# Patient Record
Sex: MALE | Race: WHITE | Employment: FULL TIME | URBAN - METROPOLITAN AREA
[De-identification: names, ages, dates, MRNs, and addresses within clinical notes are randomized per-mention and may not be internally consistent; named-entity substitution may affect disease eponyms.]

---

## 2017-08-04 ENCOUNTER — OFFICE VISIT (OUTPATIENT)
Dept: FAMILY MEDICINE | Facility: CLINIC | Age: 27
End: 2017-08-04
Payer: COMMERCIAL

## 2017-08-04 VITALS
HEART RATE: 76 BPM | DIASTOLIC BLOOD PRESSURE: 87 MMHG | SYSTOLIC BLOOD PRESSURE: 130 MMHG | TEMPERATURE: 98.5 F | WEIGHT: 260 LBS

## 2017-08-04 DIAGNOSIS — R19.5 CHANGE IN STOOL: ICD-10-CM

## 2017-08-04 DIAGNOSIS — R10.13 ABDOMINAL PAIN, EPIGASTRIC: Primary | ICD-10-CM

## 2017-08-04 DIAGNOSIS — K92.1 BLOOD IN STOOL: ICD-10-CM

## 2017-08-04 PROCEDURE — 99204 OFFICE O/P NEW MOD 45 MIN: CPT | Performed by: FAMILY MEDICINE

## 2017-08-04 RX ORDER — PANTOPRAZOLE SODIUM 40 MG/1
40 TABLET, DELAYED RELEASE ORAL DAILY
Qty: 90 TABLET | Refills: 1 | Status: SHIPPED | OUTPATIENT
Start: 2017-08-04 | End: 2018-02-26

## 2017-08-04 NOTE — PATIENT INSTRUCTIONS
Please  call [phone] to schedule your ultrasound.      Start protonix everyday.        Follow up with Nina Matthews MD in 6 weeks.

## 2017-08-04 NOTE — NURSING NOTE
Chief Complaint   Patient presents with     Abdominal Pain       Initial /87  Pulse 76  Temp 98.5  F (36.9  C) (Oral)  Wt 260 lb (117.9 kg) There is no height or weight on file to calculate BMI.  Medication Reconciliation: complete  Quita Diaz , CMA

## 2017-08-04 NOTE — MR AVS SNAPSHOT
After Visit Summary   8/4/2017    Cristofer Castillo    MRN: [id number]           Patient Information     Date Of Birth          1990        Visit Information        Provider Department      8/4/2017 11:55 AM Nina Matthews MD [facility]        Today's Diagnoses     Abdominal pain, epigastric    -  1    Blood in stool        Change in stool          Care Instructions    Please  call [phone] to schedule your ultrasound.      Start protonix everyday.        Follow up with Nina Matthews MD in 6 weeks.            Follow-ups after your visit        Additional Services     GASTROENTEROLOGY ADULT REF PROCEDURE ONLY       Last Lab Result: No results found for: CR  There is no height or weight on file to calculate BMI.     Needed:  No  Language:  English    Patient will be contacted to schedule procedure.     Please be aware that coverage of these services is subject to the terms and limitations of your health insurance plan.  Call member services at your health plan with any benefit or coverage questions.  Any procedures must be performed at a [facility] facility OR coordinated by your clinic's referral office.    Please bring the following with you to your appointment:    (1) Any X-Rays, CTs or MRIs which have been performed.  Contact the facility where they were done to arrange for  prior to your scheduled appointment.    (2) List of current medications   (3) This referral request   (4) Any documents/labs given to you for this referral                  Future tests that were ordered for you today     Open Future Orders        Priority Expected Expires Ordered    US Abdomen Complete Routine  8/4/2018 8/4/2017            Who to contact     If you have questions or need follow up information about today's clinic visit or your schedule please contact [facility] directly at [phone].  Normal or non-critical lab and imaging results will be communicated  "to you by MyChart, letter or phone within 4 business days after the clinic has received the results. If you do not hear from us within 7 days, please contact the clinic through UpMo or phone. If you have a critical or abnormal lab result, we will notify you by phone as soon as possible.  Submit refill requests through UpMo or call your pharmacy and they will forward the refill request to us. Please allow 3 business days for your refill to be completed.          Additional Information About Your Visit        [facility] [employer] Information     UpMo lets you send messages to your doctor, view your test results, renew your prescriptions, schedule appointments and more. To sign up, go to [email or web address] . Click on \"Log in\" on the left side of the screen, which will take you to the Welcome page. Then click on \"Sign up Now\" on the right side of the page.     You will be asked to enter the access code listed below, as well as some personal information. Please follow the directions to create your username and password.     Your access code is: [email or web address]  Expires: 2017 12:23 PM     Your access code will  in 90 days. If you need help or a new code, please call your [place] clinic or [phone].        Care EveryWhere ID     This is your Care EveryWhere ID. This could be used by other organizations to access your [place] medical records  [id number]        Your Vitals Were     Pulse Temperature                76 98.5  F (36.9  C) (Oral)           Blood Pressure from Last 3 Encounters:   17 130/87    Weight from Last 3 Encounters:   17 260 lb (117.9 kg)              We Performed the Following     GASTROENTEROLOGY ADULT REF PROCEDURE ONLY          Today's Medication Changes          These changes are accurate as of: 17 12:23 PM.  If you have any questions, ask your nurse or doctor.               Start taking these medicines.        Dose/Directions    pantoprazole 40 MG EC tablet   Commonly " known as:  PROTONIX   Used for:  Abdominal pain, epigastric   Started by:  Nina Matthews MD        Dose:  40 mg   Take 1 tablet (40 mg) by mouth daily Take 30-60 minutes before a meal.   Quantity:  90 tablet   Refills:  1            Where to get your medicines      These medications were sent to [facility] [address] [place], [place] - [address] [facility] [address]     Phone:  [phone]     pantoprazole 40 MG EC tablet                Primary Care Provider Office Phone # Fax #    [facility] [phone] [address] [facility]. [facility] [address]        Equal Access to Services     RICHIE BUSH : Gabby mckeono Sosamantha, waaxda luqadaha, qaybta kaalmada adehyacinthyada, josi george. So [facility] [phone].    ATENCIÓN: Si habla español, tiene a munson disposición servicios gratuitos de asistencia lingüística. Llame al [phone].    We comply with applicable federal civil rights laws and [place] laws. We do not discriminate on the basis of race, color, national origin, age, disability sex, sexual orientation or gender identity.            Thank you!     Thank you for choosing [facility]  for your care. Our goal is always to provide you with excellent care. Hearing back from our patients is one way we can continue to improve our services. Please take a few minutes to complete the written survey that you may receive in the mail after your visit with us. Thank you!             Your Updated Medication List - Protect others around you: Learn how to safely use, store and throw away your medicines at www.disposemymeds.org.          This list is accurate as of: 8/4/17 12:23 PM.  Always use your most recent med list.                   Brand Name Dispense Instructions for use Diagnosis    pantoprazole 40 MG EC tablet    PROTONIX    90 tablet    Take 1 tablet (40 mg) by mouth daily Take 30-60 minutes before a meal.     Abdominal pain, epigastric

## 2017-08-04 NOTE — PROGRESS NOTES
SUBJECTIVE:                                                    Cristofer Castillo is a 26 year old male who presents to clinic today for the following health issues:      Abdominal Pain      Duration: ongoing     Description (location/character/radiation): center       Associated flank pain: None    Intensity:  moderate    Accompanying signs and symptoms:        Fever/Chills: no        Gas/Bloating: YES- heartburn        Nausea/vomitting: YES       Diarrhea: YES- very frequent but also has hard firm stools, pt will have some bright red blood, states that sometimes will be painful        Dysuria or Hematuria: no     History (previous similar pain/trauma/previous testing): pt states that after eating greasy food or rich food will make patient vomit , sometimes triggered by stress , has high stress job     Has [facility] for [facility]ac [facility]     Precipitating or alleviating factors:       Pain worse with eating/BM/urination: see above , beer        Pain relieved by BM: YES- pain goes away with BM , sometimes will have blood in actual stool not just on TP, does have pain with passing stool     Therapies tried and outcome: pt states that he will have a diet coke and pain will get better, does take fiber     LMP:  not applicable    Has been diagnosed in past with hemorrhoids/fissure and GERD but medications do not help.  Admits sometimes fiber helps but is inconsistent with taking it.    No swallowing difficulties or breathing problems          Problem list and histories reviewed & adjusted, as indicated.  Additional history: as documented    There is no problem list on file for this patient.    History reviewed. No pertinent surgical history.    Social History   Substance Use Topics     Smoking status: Never Smoker     Smokeless tobacco: Never Used     Alcohol use Yes     History reviewed. No pertinent family history.      Current Outpatient Prescriptions   Medication Sig Dispense Refill      pantoprazole (PROTONIX) 40 MG EC tablet Take 1 tablet (40 mg) by mouth daily Take 30-60 minutes before a meal. 90 tablet 1     BP Readings from Last 3 Encounters:   08/04/17 130/87    Wt Readings from Last 3 Encounters:   08/04/17 260 lb (117.9 kg)                  Labs reviewed in EPIC        Reviewed and updated as needed this visit by clinical staffTobacco  Allergies  Meds  Problems  Med Hx  Surg Hx  Fam Hx  Soc Hx        Reviewed and updated as needed this visit by Provider  Tobacco  Allergies  Meds  Problems  Med Hx  Surg Hx  Soc Hx        ROS:  Constitutional, HEENT, cardiovascular, pulmonary, gi and gu systems are negative, except as otherwise noted.      OBJECTIVE:   /87  Pulse 76  Temp 98.5  F (36.9  C) (Oral)  Wt 260 lb (117.9 kg)  There is no height or weight on file to calculate BMI.  GENERAL: healthy, alert and no distress  EYES: Eyes grossly normal to inspection, PERRL and conjunctivae and sclerae normal  ABDOMEN:sof,  tenderness epigastric and LLQ, no organomegaly or masses and bowel sounds normal  RECTAL (male): normal sphincter tone, no rectal masses, prostate normal size, smooth, nontender without nodules or masses  MS: no gross musculoskeletal defects noted, no edema  SKIN: no suspicious lesions or rashes  PSYCH: mentation appears normal, affect normal/bright and anxious    Diagnostic Test Results:  none     ASSESSMENT/PLAN:     (R10.13) Abdominal pain, epigastric  (primary encounter diagnosis)  Comment: likely GERD but possible gallbladder issue  Plan: pantoprazole (PROTONIX) 40 MG EC tablet, US         Abdomen Complete        Start protonix daily. US of abdomen, may need to consider EGD  F/u 6 weeks in clinic    (K92.1) Blood in stool  (R19.5) Change in stool  Comment: likely due to fissure and IBSbut cannot fully r/o IBD,   Plan: GASTROENTEROLOGY ADULT REF PROCEDURE ONLY        Refer for colonoscopy      See Patient Instructions    Nina Matthews MD  [facility]  [place]

## 2017-08-17 ENCOUNTER — HOSPITAL ENCOUNTER (OUTPATIENT)
Facility: AMBULATORY SURGERY CENTER | Age: 27
Discharge: HOME | End: 2017-08-17
Attending: SURGERY | Admitting: SURGERY
Payer: COMMERCIAL

## 2017-08-17 ENCOUNTER — SURGERY (OUTPATIENT)
Age: 27
End: 2017-08-17
Payer: COMMERCIAL

## 2017-08-17 VITALS
RESPIRATION RATE: 20 BRPM | TEMPERATURE: 98.3 F | DIASTOLIC BLOOD PRESSURE: 94 MMHG | BODY MASS INDEX: 33.37 KG/M2 | WEIGHT: 260 LBS | HEIGHT: 74 IN | OXYGEN SATURATION: 98 % | SYSTOLIC BLOOD PRESSURE: 126 MMHG

## 2017-08-17 LAB
COLONOSCOPY: NORMAL
UPPER GI ENDOSCOPY: NORMAL

## 2017-08-17 PROCEDURE — G8918 PT W/O PREOP ORDER IV AB PRO: HCPCS

## 2017-08-17 PROCEDURE — G8907 PT DOC NO EVENTS ON DISCHARG: HCPCS

## 2017-08-17 PROCEDURE — 43239 EGD BIOPSY SINGLE/MULTIPLE: CPT

## 2017-08-17 PROCEDURE — 88305 TISSUE EXAM BY PATHOLOGIST: CPT | Performed by: SURGERY

## 2017-08-17 PROCEDURE — 45380 COLONOSCOPY AND BIOPSY: CPT | Performed by: SURGERY

## 2017-08-17 PROCEDURE — 99153 MOD SED SAME PHYS/QHP EA: CPT | Performed by: SURGERY

## 2017-08-17 PROCEDURE — 99152 MOD SED SAME PHYS/QHP 5/>YRS: CPT | Performed by: SURGERY

## 2017-08-17 PROCEDURE — 43239 EGD BIOPSY SINGLE/MULTIPLE: CPT | Performed by: SURGERY

## 2017-08-17 PROCEDURE — 45380 COLONOSCOPY AND BIOPSY: CPT

## 2017-08-17 RX ORDER — DIPHENHYDRAMINE HYDROCHLORIDE 50 MG/ML
INJECTION INTRAMUSCULAR; INTRAVENOUS PRN
Status: DISCONTINUED | OUTPATIENT
Start: 2017-08-17 | End: 2017-08-17 | Stop reason: HOSPADM

## 2017-08-17 RX ORDER — LIDOCAINE 40 MG/G
CREAM TOPICAL
Status: DISCONTINUED | OUTPATIENT
Start: 2017-08-17 | End: 2017-08-18 | Stop reason: HOSPADM

## 2017-08-17 RX ORDER — FENTANYL CITRATE 50 UG/ML
INJECTION, SOLUTION INTRAMUSCULAR; INTRAVENOUS PRN
Status: DISCONTINUED | OUTPATIENT
Start: 2017-08-17 | End: 2017-08-17 | Stop reason: HOSPADM

## 2017-08-17 RX ADMIN — FENTANYL CITRATE 100 MCG: 50 INJECTION, SOLUTION INTRAMUSCULAR; INTRAVENOUS at 13:15

## 2017-08-17 RX ADMIN — DIPHENHYDRAMINE HYDROCHLORIDE 12.5 MG: 50 INJECTION INTRAMUSCULAR; INTRAVENOUS at 12:44

## 2017-08-17 RX ADMIN — FENTANYL CITRATE 100 MCG: 50 INJECTION, SOLUTION INTRAMUSCULAR; INTRAVENOUS at 12:40

## 2017-08-17 RX ADMIN — FENTANYL CITRATE 100 MCG: 50 INJECTION, SOLUTION INTRAMUSCULAR; INTRAVENOUS at 13:03

## 2017-08-21 LAB — COPATH REPORT: NORMAL

## 2017-09-22 ENCOUNTER — RADIANT APPOINTMENT (OUTPATIENT)
Dept: ULTRASOUND IMAGING | Facility: CLINIC | Age: 27
End: 2017-09-22
Attending: FAMILY MEDICINE
Payer: COMMERCIAL

## 2017-09-22 DIAGNOSIS — R10.13 ABDOMINAL PAIN, EPIGASTRIC: ICD-10-CM

## 2017-09-22 PROCEDURE — 76700 US EXAM ABDOM COMPLETE: CPT

## 2018-02-26 DIAGNOSIS — R10.13 ABDOMINAL PAIN, EPIGASTRIC: ICD-10-CM

## 2018-02-27 RX ORDER — PANTOPRAZOLE SODIUM 40 MG/1
TABLET, DELAYED RELEASE ORAL
Qty: 90 TABLET | Refills: 1 | Status: SHIPPED | OUTPATIENT
Start: 2018-02-27 | End: 2021-12-29

## 2020-03-11 ENCOUNTER — HEALTH MAINTENANCE LETTER (OUTPATIENT)
Age: 30
End: 2020-03-11

## 2020-12-27 ENCOUNTER — HEALTH MAINTENANCE LETTER (OUTPATIENT)
Age: 30
End: 2020-12-27

## 2021-04-12 ENCOUNTER — OFFICE VISIT (OUTPATIENT)
Dept: OPTOMETRY | Facility: CLINIC | Age: 31
End: 2021-04-12
Payer: COMMERCIAL

## 2021-04-12 DIAGNOSIS — Z01.00 ENCOUNTER FOR EXAMINATION OF EYES AND VISION WITHOUT ABNORMAL FINDINGS: Primary | ICD-10-CM

## 2021-04-12 DIAGNOSIS — Z46.0 CONTACT LENS/GLASSES FITTING: ICD-10-CM

## 2021-04-12 DIAGNOSIS — H52.13 MYOPIA OF BOTH EYES: ICD-10-CM

## 2021-04-12 PROCEDURE — 92310 CONTACT LENS FITTING OU: CPT | Mod: GA | Performed by: OPTOMETRIST

## 2021-04-12 PROCEDURE — 92004 COMPRE OPH EXAM NEW PT 1/>: CPT | Performed by: OPTOMETRIST

## 2021-04-12 ASSESSMENT — EXTERNAL EXAM - RIGHT EYE: OD_EXAM: NORMAL

## 2021-04-12 ASSESSMENT — VISUAL ACUITY
OS_CC: 20/20
OD_CC+: -1
CORRECTION_TYPE: GLASSES
METHOD: SNELLEN - LINEAR
OD_CC: 20/20

## 2021-04-12 ASSESSMENT — CUP TO DISC RATIO
OS_RATIO: 0.3
OD_RATIO: 0.25

## 2021-04-12 ASSESSMENT — REFRACTION_CURRENTRX
OD_BRAND: J&J 1-DAY ACUVUE MOIST BC 8.5, D 14.2
OD_SPHERE: -1.00
OS_SPHERE: -1.00
OS_BRAND: J&J 1-DAY ACUVUE MOIST BC 8.5, D 14.2

## 2021-04-12 ASSESSMENT — TONOMETRY
OD_IOP_MMHG: 14
IOP_METHOD: APPLANATION
OS_IOP_MMHG: 14

## 2021-04-12 ASSESSMENT — REFRACTION_MANIFEST
OD_CYLINDER: SPHERE
OS_SPHERE: -1.00
OD_SPHERE: -1.00
OS_CYLINDER: SPHERE

## 2021-04-12 ASSESSMENT — REFRACTION_WEARINGRX
OD_CYLINDER: SPHERE
OS_CYLINDER: SPHERE
OD_SPHERE: -1.00
SPECS_TYPE: SVL
OS_SPHERE: -1.00

## 2021-04-12 ASSESSMENT — SLIT LAMP EXAM - LIDS
COMMENTS: NORMAL
COMMENTS: NORMAL

## 2021-04-12 ASSESSMENT — EXTERNAL EXAM - LEFT EYE: OS_EXAM: NORMAL

## 2021-04-12 ASSESSMENT — CONF VISUAL FIELD
OD_NORMAL: 1
OS_NORMAL: 1

## 2021-04-12 NOTE — PATIENT INSTRUCTIONS
Glasses prescription provided today. No change from current glasses.     Begin trial of 1-Day Acuvue Moist contact lenses.   Maximum wear-time of 12 hours/day of the contact lenses.   Clean the lenses nightly in contact lens solution.   No sleeping or swimming in the contact lenses.     If adequate comfort/vision with contact lenses, we can finalize the prescription. If not, notify me and we can consider other options.     Dilation deferred today. Patient educated on the importance of dilation in order to fully assess the internal ocular health. Patient voiced understanding. Return to clinic to complete the dilated portion of the examination, if desired.     Colten Murray, KANDACE  [address]  [place]  [address] [phone]

## 2021-04-12 NOTE — LETTER
4/12/2021         RE: Cristofer Castillo  [address] [facility] [address]        Dear Colleague,    Thank you for referring your patient, Cristofer Castillo, to the [facility]. Please see a copy of my visit note below.    Chief Complaint   Patient presents with     COMPREHENSIVE EYE EXAM     Accompanied by self  Previous contact lens wearer? Yes: soft daily lenses.  Comfort of contact lenses :good  Satisfied with current lenses: Yes, unsure of brand; has tried several in the past, never really had any issues      Last Eye Exam: 2019 @ [facility]  Dilated Previously: Yes. PT requests to defer dilation today.    What are you currently using to see?  glasses and contacts    Distance Vision Acuity: Satisfied with vision    Near Vision Acuity: Satisfied with vision while reading and using computer unaided    Eye Comfort: good  Do you use eye drops? 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       OBJECTIVE: See Ophthalmology exam    ASSESSMENT:    ICD-10-CM    1. Encounter for examination of eyes and vision without abnormal findings  Z01.00    2. Myopia of both eyes  H52.13    3. Contact lens/glasses fitting  Z46.0       PLAN:     Patient Instructions   Glasses prescription provided today. No change from current glasses.     Begin trial of 1-Day Acuvue Moist contact lenses.   Maximum wear-time of 12 hours/day of the contact lenses.   Clean the lenses nightly in contact lens solution.   No sleeping or swimming in the contact lenses.     If adequate comfort/vision with contact lenses, we can finalize the prescription. If not, notify me and we can consider other options.     Dilation deferred today. Patient educated on the importance of dilation in order to fully assess the internal ocular health. Patient voiced understanding. Return to clinic to complete  the dilated portion of the examination, if desired.     Colten Murray OD  [address]. [place]  [address]    [phone]                              Again, thank you for allowing me to participate in the care of your patient.        Sincerely,        Colten Murray OD

## 2021-04-12 NOTE — PROGRESS NOTES
Chief Complaint   Patient presents with     COMPREHENSIVE EYE EXAM     Accompanied by self  Previous contact lens wearer? Yes: soft daily lenses.  Comfort of contact lenses :good  Satisfied with current lenses: Yes, unsure of brand; has tried several in the past, never really had any issues      Last Eye Exam: 2019 @ [facility]  Dilated Previously: Yes. PT requests to defer dilation today.    What are you currently using to see?  glasses and contacts    Distance Vision Acuity: Satisfied with vision    Near Vision Acuity: Satisfied with vision while reading and using computer unaided    Eye Comfort: good  Do you use eye drops? 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       OBJECTIVE: See Ophthalmology exam    ASSESSMENT:    ICD-10-CM    1. Encounter for examination of eyes and vision without abnormal findings  Z01.00    2. Myopia of both eyes  H52.13    3. Contact lens/glasses fitting  Z46.0       PLAN:     Patient Instructions   Glasses prescription provided today. No change from current glasses.     Begin trial of 1-Day Acuvue Moist contact lenses.   Maximum wear-time of 12 hours/day of the contact lenses.   Clean the lenses nightly in contact lens solution.   No sleeping or swimming in the contact lenses.     If adequate comfort/vision with contact lenses, we can finalize the prescription. If not, notify me and we can consider other options.     Dilation deferred today. Patient educated on the importance of dilation in order to fully assess the internal ocular health. Patient voiced understanding. Return to clinic to complete the dilated portion of the examination, if desired.     Colten Murray, OD  [facility] [place]  [address] [place] [address]. [place]  [address]    [phone]

## 2021-04-25 ENCOUNTER — HEALTH MAINTENANCE LETTER (OUTPATIENT)
Age: 31
End: 2021-04-25

## 2021-09-20 ENCOUNTER — E-VISIT (OUTPATIENT)
Dept: URGENT CARE | Facility: URGENT CARE | Age: 31
End: 2021-09-20
Payer: COMMERCIAL

## 2021-09-20 DIAGNOSIS — Z20.822 SUSPECTED COVID-19 VIRUS INFECTION: Primary | ICD-10-CM

## 2021-09-20 PROCEDURE — 99421 OL DIG E/M SVC 5-10 MIN: CPT | Performed by: NURSE PRACTITIONER

## 2021-09-20 NOTE — PATIENT INSTRUCTIONS
Dear Cristofer Castillo,    Your symptoms show that you may have coronavirus (COVID-19). This illness can cause fever, cough and trouble breathing. Many people get a mild case and get better on their own. Some people can get very sick.    Will I be tested for COVID-19?  We would like to test you for Covid-19 virus. I have placed orders for this test.     For all employees or close contacts (except [place] and [employer] - see below), go to your [employer] home page and scroll down to the section that says  You have an appointment that needs to be scheduled  and click the large green button that says  Schedule Now  and follow the steps to find the next available opening.     If you are unable to complete these steps or if you cannot find any available times, please call [phone] to schedule employee testing.     [place] employees or close contacts, please call [phone].   [place] ([employer]) employees or close contacts call [phone].    Return to work/school/ guidance:  Please let your workplace manager and staffing office know when your quarantine ends     We can t give you an exact date as it depends on the above. You can calculate this on your own or work with your manager/staffing office to calculate this. (For example if you were exposed on 10/4, you would have to quarantine for 14 full days. That would be through 10/18. You could return on 10/19.)      If you receive a positive COVID-19 test result, follow the guidance of the those who are giving you the results. Usually the return to work is 10 (or in some cases 20 days from symptom onset.) If you work at [employer], you must also be cleared by Employee Occupational Health and Safety to return to work.        If you receive a negative COVID-19 test result and did not have a high risk exposure to someone with a known positive COVID-19 test, you can return to work once you're free of fever for 24 hours without fever-reducing medication and  your symptoms are improving or resolved.      If you receive a negative COVID-19 test and If you had a high risk exposure to someone who has tested positive for COVID-19 then you can return to work 14 days after your last contact with the positive individual    Note: If you have ongoing exposure to the covid positive person, this quarantine period may be more than 14 days. (For example, if you are continued to be exposed to your child who tested positive and cannot isolate from them, then the quarantine of 7-14 days can't start until your child is no longer contagious. This is typically 10 days from onset of the child's symptoms. So the total duration may be 17-24 days in this case.)    Sign up for [employer].   We know it's scary to hear that you might have COVID-19. We want to track your symptoms to make sure you're okay over the next 2 weeks. Please look for an email from [employer]--this is a free, online program that we'll use to keep in touch. To sign up, follow the link in the email you will receive. Learn more at [email or web address]    How can I take care of myself?    Get lots of rest. Drink extra fluids (unless a doctor has told you not to)    Take Tylenol (acetaminophen) or ibuprofen for fever or pain. If you have liver or kidney problems, ask your family doctor if it's okay to take Tylenol o ibuprofen    If you have other health problems (like cancer, heart failure, an organ transplant or severe kidney disease): Call your specialty clinic if you don't feel better in the next 2 days.    Know when to call 911. Emergency warning signs include:  o Trouble breathing or shortness of breath  o Pain or pressure in the chest that doesn't go away  o Feeling confused like you haven't felt before, or not being able to wake up  o Bluish-colored lips or face    Where can I get more information?   [facility] - About COVID-19:   [email or web address]    CDC - What to Do If You're Sick:    www.cdc.gov/coronavirus/2019-ncov/about/steps-when-sick.html

## 2021-09-21 ENCOUNTER — NURSE TRIAGE (OUTPATIENT)
Dept: NURSING | Facility: CLINIC | Age: 31
End: 2021-09-21

## 2021-09-21 ENCOUNTER — HOSPITAL ENCOUNTER (EMERGENCY)
Facility: CLINIC | Age: 31
Discharge: HOME | End: 2021-09-21
Attending: EMERGENCY MEDICINE | Admitting: EMERGENCY MEDICINE
Payer: COMMERCIAL

## 2021-09-21 VITALS
HEART RATE: 88 BPM | TEMPERATURE: 98.6 F | BODY MASS INDEX: 37.77 KG/M2 | HEIGHT: 73 IN | WEIGHT: 285 LBS | DIASTOLIC BLOOD PRESSURE: 85 MMHG | SYSTOLIC BLOOD PRESSURE: 131 MMHG | OXYGEN SATURATION: 98 % | RESPIRATION RATE: 16 BRPM

## 2021-09-21 DIAGNOSIS — B08.4 HAND, FOOT AND MOUTH DISEASE: ICD-10-CM

## 2021-09-21 LAB
DEPRECATED S PYO AG THROAT QL EIA: NEGATIVE
GROUP A STREP BY PCR: NOT DETECTED

## 2021-09-21 PROCEDURE — 99283 EMERGENCY DEPT VISIT LOW MDM: CPT | Performed by: EMERGENCY MEDICINE

## 2021-09-21 PROCEDURE — 87651 STREP A DNA AMP PROBE: CPT | Performed by: EMERGENCY MEDICINE

## 2021-09-21 PROCEDURE — 99284 EMERGENCY DEPT VISIT MOD MDM: CPT | Performed by: EMERGENCY MEDICINE

## 2021-09-21 PROCEDURE — 250N000013 HC RX MED GY IP 250 OP 250 PS 637: Performed by: EMERGENCY MEDICINE

## 2021-09-21 RX ORDER — IBUPROFEN 200 MG
800 TABLET ORAL EVERY 8 HOURS PRN
Qty: 30 TABLET | Refills: 0 | COMMUNITY
Start: 2021-09-21 | End: 2021-09-26

## 2021-09-21 RX ORDER — IBUPROFEN 400 MG/1
800 TABLET, FILM COATED ORAL ONCE
Status: COMPLETED | OUTPATIENT
Start: 2021-09-21 | End: 2021-09-21

## 2021-09-21 RX ORDER — ACETAMINOPHEN 500 MG
1000 TABLET ORAL EVERY 8 HOURS PRN
Qty: 30 TABLET | Refills: 0 | COMMUNITY
Start: 2021-09-21 | End: 2021-09-26

## 2021-09-21 RX ADMIN — IBUPROFEN 800 MG: 400 TABLET, FILM COATED ORAL at 04:12

## 2021-09-21 ASSESSMENT — ENCOUNTER SYMPTOMS
CHEST TIGHTNESS: 0
FATIGUE: 0
DIARRHEA: 0
BACK PAIN: 0
SHORTNESS OF BREATH: 0
NAUSEA: 0
DIAPHORESIS: 0
NUMBNESS: 0
ABDOMINAL PAIN: 0
HEADACHES: 1
DECREASED CONCENTRATION: 0
APPETITE CHANGE: 1
RHINORRHEA: 0
FEVER: 0
LIGHT-HEADEDNESS: 0
COUGH: 0
SORE THROAT: 1
NERVOUS/ANXIOUS: 1
VOMITING: 0
WEAKNESS: 0

## 2021-09-21 ASSESSMENT — MIFFLIN-ST. JEOR: SCORE: 2316.63

## 2021-09-21 NOTE — ED PROVIDER NOTES
History     Chief Complaint   Patient presents with     Rash     hands ears      HPI  Cristofer Castillo is a 30 year old male with no significant protruding past medical history presented for evaluation of rash.  Patient ports he felt sick over the weekend with abdominal pain, nausea, vomiting and sore throat.  Was having some fever and chills as well which have largely subsided but yesterday and today started developing rash on his hands.  He reports painful red lesions on his hands as well as sensation of painful similar lesions on his feet although has not seen as many dots on his feet.  Also developed some lesions on his ears bilaterally, right more than left and on the nose.  Does feel similar painful sores in his mouth and is having some pain with swallowing.  Overall he reports he is feeling better today but the new painful lesions were concerning especially when he developed them on his face and ears.  No known sick contacts.  He does have 1-year-old child at home but reports the child has no rash or similar symptoms.  Patient reports he has been eating less than normal due to the pain with swallowing but still drinking.  Normal urination and bowel movements.    Allergies:  Allergies   Allergen Reactions     Naila Anaphylaxis       Problem List:    There are no problems to display for this patient.       Past Medical History:    Past Medical History:   Diagnosis Date     Motion sickness        Past Surgical History:    Past Surgical History:   Procedure Laterality Date     COLONOSCOPY N/A 8/17/2017    Procedure: COMBINED COLONOSCOPY, SINGLE OR MULTIPLE BIOPSY/POLYPECTOMY BY BIOPSY;;  Surgeon: Nolan Tang MD;  Location:  OR     COLONOSCOPY WITH CO2 INSUFFLATION N/A 8/17/2017    Procedure: COLONOSCOPY WITH CO2 INSUFFLATION;  Colon-blood in stool / [facility];  Surgeon: Nolan Tang MD;  Location:  OR     COMBINED ESOPHAGOSCOPY, GASTROSCOPY, DUODENOSCOPY (EGD) WITH CO2 INSUFFLATION N/A  "8/17/2017    Procedure: COMBINED ESOPHAGOSCOPY, GASTROSCOPY, DUODENOSCOPY (EGD) WITH CO2 INSUFFLATION;;  Surgeon: Nolan Tang MD;  Location: [facility] OR     ENT SURGERY      nose surgery, fx, deviated septum     ESOPHAGOSCOPY, GASTROSCOPY, DUODENOSCOPY (EGD), COMBINED N/A 8/17/2017    Procedure: COMBINED ESOPHAGOSCOPY, GASTROSCOPY, DUODENOSCOPY (EGD), BIOPSY SINGLE OR MULTIPLE;;  Surgeon: Nolan Tang MD;  Location: [facility] OR       Family History:    No family history on file.    Social History:  Marital Status:   [2]  Social History     Tobacco Use     Smoking status: Never Smoker     Smokeless tobacco: Never Used   Substance Use Topics     Alcohol use: Yes     Comment: hasn't had alcohol for almost 4 years     Drug use: No        Medications:    acetaminophen (TYLENOL) 500 MG tablet  ibuprofen (ADVIL/MOTRIN) 200 MG tablet  Naproxen Sodium (ALEVE PO)  pantoprazole (PROTONIX) 40 MG EC tablet          Review of Systems   Constitutional: Positive for appetite change (Slightly decreased). Negative for diaphoresis, fatigue and fever.   HENT: Positive for sore throat. Negative for congestion and rhinorrhea.    Respiratory: Negative for cough, chest tightness and shortness of breath.    Cardiovascular: Negative for chest pain.   Gastrointestinal: Negative for abdominal pain, diarrhea, nausea and vomiting.   Genitourinary: Negative for decreased urine volume.   Musculoskeletal: Negative for back pain.   Skin: Positive for rash.   Neurological: Positive for headaches (Mild). Negative for weakness, light-headedness and numbness.   Psychiatric/Behavioral: Negative for decreased concentration. The patient is nervous/anxious.    All other systems reviewed and are negative.      Physical Exam   BP: 131/85  Pulse: 88  Temp: 98.6  F (37  C)  Resp: 16  Height: 185.4 cm (6' 1\")  Weight: 129.3 kg (285 lb)  SpO2: 98 %      Physical Exam  Vitals and nursing note reviewed.   Constitutional:       Appearance: Normal " appearance. He is not ill-appearing or diaphoretic.      Comments: Well-appearing sitting upright in the chair in the hospital room in no distress   HENT:      Head: Atraumatic.      Nose:      Comments: Several small open sores on the nose, see photo     Mouth/Throat:      Mouth: Mucous membranes are moist.      Pharynx: Oropharyngeal exudate and posterior oropharyngeal erythema present.      Comments: Posterior pharyngeal erythema with bilateral tonsillar hypertrophy and exudate.  A few small erythematous nodules present on the roof of the mouth he reports to be tender  Eyes:      Conjunctiva/sclera: Conjunctivae normal.   Cardiovascular:      Rate and Rhythm: Normal rate.      Pulses: Normal pulses.      Heart sounds: Normal heart sounds.   Pulmonary:      Effort: Pulmonary effort is normal.      Breath sounds: Normal breath sounds.   Abdominal:      Palpations: Abdomen is soft.      Tenderness: There is no abdominal tenderness.   Musculoskeletal:         General: Normal range of motion.      Cervical back: Normal range of motion.   Skin:     General: Skin is warm and dry.      Capillary Refill: Capillary refill takes less than 2 seconds.      Findings: Rash (Erythematous papular rash on the hands including the palms as well as on both ears, nose, and a few small lesions on the feet and mouth) present.      Comments: All the erythematous nodules are painful and tender to the touch   Neurological:      Mental Status: He is alert and oriented to person, place, and time.   Psychiatric:         Mood and Affect: Mood normal.                                 ED Course        Procedures             Results for orders placed or performed during the hospital encounter of 09/21/21 (from the past 24 hour(s))   Streptococcus A Rapid Scr w Reflx to PCR    Specimen: Throat; Swab   Result Value Ref Range    Group A Strep antigen Negative Negative       Medications   ibuprofen (ADVIL/MOTRIN) tablet 800 mg (800 mg Oral Given  9/21/21 5720)       Assessments & Plan (with Medical Decision Making)  22-year-old male presenting for evaluation of a rash.  Was feeling sick with GI upset over the weekend with associated chills and fever.  Secondarily developed a sore throat and now with a rash a few days later.  Patient is overall well-appearing in no distress with reassuringly normal vital signs.  Patient reports overall feeling better other than the painful lesions on his hands, ears, mouth, and feet.  Rapid strep negative.  Tolerating secretions.  Symptoms clinically consistent with hand-foot-and-mouth disease likely from coxsackievirus.  Slightly atypical to have lesions on the ears.  There is slight yellow crusting on these lesions as well as the nose which could be from secondary bacterial infection.  Suggested topical antibiotic ointment for these.  Overall anticipate symptoms will resolve spontaneously as is the usual clinical course for coxsackie virus.  Patient advised if symptoms worsen or new symptoms develop, to return for repeat evaluation.     I have reviewed the nursing notes.    I have reviewed the findings, diagnosis, plan and need for follow up with the patient.       New Prescriptions    ACETAMINOPHEN (TYLENOL) 500 MG TABLET    Take 2 tablets (1,000 mg) by mouth every 8 hours as needed for fever or pain    IBUPROFEN (ADVIL/MOTRIN) 200 MG TABLET    Take 4 tablets (800 mg) by mouth every 8 hours as needed for mild pain       Final diagnoses:   Hand, foot and mouth disease       9/21/2021   [facility] EMERGENCY DEPT     Bishop, Jaden Chapman MD  09/21/21 5763

## 2021-09-21 NOTE — ED TRIAGE NOTES
Saturday had a headache and vomiting chills on Sunday sore throat went had an e visit went to [facility] for covid test     Then developed a rash on hands and ears called nurse line back was advised to come in

## 2021-09-21 NOTE — DISCHARGE INSTRUCTIONS
Alternate acetaminophen with ibuprofen every 4 hours as needed for pain or fever (example: acetaminophen at 8am, ibuprofen at 12pm, acetaminophen at 4pm, ibuprofen at 8pm, etc).  I recommended keeping a note documenting which medication you gave and the time it was given. This will help you keep track of what medication to give next.  See discharge papers or medication label for dose.            Hand, foot, and mouth disease      Contributors: Medical   Overview  Hand-foot-and-mouth disease is a suddenly appearing (acute), self-limited viral disease caused by viruses of the enterovirus group, particularly Coxsackievirus A16. The development (incubation) period from infection to symptoms is short, from 3-6 days. The disease is highly contagious and often spreads from child to child and then from child to adult. Spread of the disease occurs by means of direct contact with nasal and/or oral secretions and stool contact. Widespread (epidemic) outbreaks usually occur from June to October.    Complications from hand-foot-and-mouth disease rarely occur, but they may include pneumonia, inflammation of the heart or brain, or miscarriage in pregnant women who become infected.    Hand-foot-and-mouth disease is not related to foot-and-mouth disease seen in animals.  Who?s At Risk  Hand-foot-and-mouth disease most often occurs in infants and children under 10, but teens and adults can also get it. Once a child has been infected, he or she might be immune to a repeat infection by the same virus that caused the first infection, but infection with related viruses can still occur.  Signs & Symptoms  Hand-foot-and-mouth disease begins with fever up to 101 degrees Fahrenheit, sore throat, sore mouth, cough, headache, fatigue, loss of appetite, and, occasionally, joint pain. After 1-2 days, a rash appears.    Small, red areas of the lining of the mouth, tongue, gums, or throat develop into blisters and rapidly form sores  with loss of tissue (ulcerations). Lesions develop a shallow, yellow-gray base and a red surrounding area. Lesions on arms and legs (extremities) begin as red, flat spots that produce oval or football-shaped blisters, surrounded with red coloration. Hand and foot lesions are common on the sides and backs of the fingers and toes. Palms and soles may also be affected.    The skin lesions associated with hand-foot-and-mouth disease may be painful.    In about a week, the rash will disappear and your child will feel better.  Self-Care Guidelines  Hand-foot-and-mouth disease is a self-limited viral infection, so it only needs to be treated for bothersome symptoms. To reduce viral spreading, do not rupture blisters. The virus may be present in a person's stool for 1 month. Be careful to avoid passing the infection to other people by practicing good hygiene. Wash your hands and your child's hands frequently, particularly after using the bathroom or diaper changes and before eating.    Although most pregnant women who become infected with hand-foot-and-mouth disease have no symptoms or just a mild illness, a woman infected shortly before delivery could pass the infection to her baby, who may become very ill. Therefore, any infected child should avoid contact with pregnant women, particularly in late pregnancy.    You may choose to keep your child out of school or day care, but it is not clear this will prevent others from becoming infected, as the illness probably infected others before symptoms were noticed.    Acetaminophen (Tylenol) or ibuprofen may be used for fever and pain. (Do not use aspirin for children under 12.)    Be sure your child drinks plenty of fluids to stay well hydrated. The child may tolerate cold milk products better than fruit juices.  When to Seek Medical Care  See your child's doctor if fever is present and is not brought down to normal by medication or if your child has a severe headache, stiff  neck, irritability, reduced awareness (lethargy), or if he or she appears very ill.  Treatments Your Physician May Prescribe  Blood tests and procedures to identify the cause of the infection (cultures) are not usually done. If the doctor is concerned that the child might be infected with the bacteria Streptococcus (strep infection), a throat culture may be done.    Antibiotics do not help hand-foot-and-mouth disease. The doctor will likely recommend that you give the child fluids and something to relieve the pain.       For more images and patient information, visit [email or web address]  Copyright   2021 [employer] . All rights reserved.

## 2021-09-21 NOTE — TELEPHONE ENCOUNTER
Pt called stating saturday and Sunday he has  Been voting sweating previously, chills, shakes that subsided. Pt reported shortness of breath when he takes deep breath or coughs, chest pain  when he coughs, or takes deep breath laughing hurts.      Pt also reported painful rash that started around 4 pm this afternoon. Pt reported rash on his ear lobes and they are swollen, feet, nose, and hands. Pt stated his feet feels he is stading on needles, hands are painful when making a fist.  Pt reported sore throat and difficulty swallowing. Pt denied congestion fells warm, no thermometer to check temp. Pt did covid 19 test that is bending, no known covid 19 exposure. Pt reported over the weekend he did eat from a restaurant and did a [employer] art class.       Per [facility] pt was advised to call 911,refused,  pt stated he will come in to the emergency department in [place]. RN advised pt is his symptoms get worse to call 911 and pt stated okay.         Jayden Cosme RN  [facility]       COVID 19 Nurse Triage Plan/Patient Instructions    Please be aware that novel coronavirus (COVID-19) may be circulating in the community. If you develop symptoms such as fever, cough, or SOB or if you have concerns about the presence of another infection including coronavirus (COVID-19), please contact your health care provider or visit [email or web address].     Disposition/Instructions    Call to EMS/911 recommended. Follow protocol based instructions.     Bring Your Own Device:  Please also bring your smart device(s) (smart phones, tablets, laptops) and their charging cables for your personal use and to communicate with your care team during your visit.    Thank you for taking steps to prevent the spread of this virus.  o Limit your contact with others.  o Wear a simple mask to cover your cough.  o Wash your hands well and often.    Resources    [facility]: About COVID-19:  [email or web address]    CDC: What to Do If You're Sick: www.cdc.gov/coronavirus/2019-ncov/about/steps-when-sick.html    CDC: Ending Home Isolation: www.cdc.gov/coronavirus/2019-ncov/hcp/disposition-in-home-patients.html     CDC: Caring for Someone: www.cdc.gov/coronavirus/2019-ncov/if-you-are-sick/care-for-someone.html     [facility]: Interim Guidance for Hospital Discharge to Home: [email or web address]/[email or web address]    [facility] clinical trials (COVID-19 research studies): [email or web address].[email or web address]-clinical-trials     Below are the COVID-19 hotlines at the [place] [facility] ([facility]). Interpreters are available.   o For health questions: Call 495-576-8105 or 1-780.182.7064 (7 a.m. to 7 p.m.)  o For questions about schools and childcare: Call 267-847-9773 or 1-697.207.5791 (7 a.m. to 7 p.m.)                                  Reason for Disposition    [1] Sudden onset of rash (within last 2 hours) AND [2] difficulty with breathing or swallowing    Protocols used: RASH OR REDNESS - [id number]

## 2021-10-09 ENCOUNTER — HEALTH MAINTENANCE LETTER (OUTPATIENT)
Age: 31
End: 2021-10-09

## 2021-11-23 ENCOUNTER — APPOINTMENT (OUTPATIENT)
Dept: URGENT CARE | Facility: CLINIC | Age: 31
End: 2021-11-23
Payer: COMMERCIAL

## 2021-12-28 ENCOUNTER — E-VISIT (OUTPATIENT)
Dept: URGENT CARE | Facility: CLINIC | Age: 31
End: 2021-12-28
Payer: COMMERCIAL

## 2021-12-28 ENCOUNTER — LAB (OUTPATIENT)
Dept: URGENT CARE | Facility: URGENT CARE | Age: 31
End: 2021-12-28
Attending: EMERGENCY MEDICINE
Payer: COMMERCIAL

## 2021-12-28 DIAGNOSIS — R05.9 COUGH: ICD-10-CM

## 2021-12-28 DIAGNOSIS — R05.9 COUGH: Primary | ICD-10-CM

## 2021-12-28 LAB
FLUAV AG SPEC QL IA: NEGATIVE
FLUBV AG SPEC QL IA: NEGATIVE

## 2021-12-28 PROCEDURE — U0003 INFECTIOUS AGENT DETECTION BY NUCLEIC ACID (DNA OR RNA); SEVERE ACUTE RESPIRATORY SYNDROME CORONAVIRUS 2 (SARS-COV-2) (CORONAVIRUS DISEASE [COVID-19]), AMPLIFIED PROBE TECHNIQUE, MAKING USE OF HIGH THROUGHPUT TECHNOLOGIES AS DESCRIBED BY CMS-2020-01-R: HCPCS

## 2021-12-28 PROCEDURE — 87804 INFLUENZA ASSAY W/OPTIC: CPT

## 2021-12-28 PROCEDURE — U0005 INFEC AGEN DETEC AMPLI PROBE: HCPCS

## 2021-12-28 PROCEDURE — 99421 OL DIG E/M SVC 5-10 MIN: CPT | Performed by: EMERGENCY MEDICINE

## 2021-12-29 ENCOUNTER — VIRTUAL VISIT (OUTPATIENT)
Dept: FAMILY MEDICINE | Facility: CLINIC | Age: 31
End: 2021-12-29
Payer: COMMERCIAL

## 2021-12-29 DIAGNOSIS — J01.00 ACUTE NON-RECURRENT MAXILLARY SINUSITIS: Primary | ICD-10-CM

## 2021-12-29 LAB — SARS-COV-2 RNA RESP QL NAA+PROBE: NEGATIVE

## 2021-12-29 PROCEDURE — 99213 OFFICE O/P EST LOW 20 MIN: CPT | Mod: 95 | Performed by: PHYSICIAN ASSISTANT

## 2021-12-29 NOTE — PROGRESS NOTES
Royer is a 31 year old who is being evaluated via a billable video visit.      How would you like to obtain your AVS? MyChart  If the video visit is dropped, the invitation should be resent by: Text to cell phone: [phone]  Will anyone else be joining your video visit? No    Video Start Time: 7:33    Assessment & Plan     Acute non-recurrent maxillary sinusitis  Present on history and course length. Negative  covid and flu. Given this and course length, recommend abx treatment in combo with continued supportive care measures including otc flonase. If no improvement in 3-5 days, patient should call. Otherwise, sooner if worsening   - amoxicillin-clavulanate (AUGMENTIN) 875-125 MG tablet; Take 1 tablet by mouth 2 times daily  -Medication use and side effects discussed with the patient. Patient is in complete understanding and agreement with plan.            Return in about 3 months (around 3/29/2022) for Physical Exam, with pcp.    Eulalio Ruiz PA-C  [facility]   Royer is a 31 year old who presents for the following health issues    HPI     Acute Illness  Acute illness concerns: Upper respiratory symptoms  Onset/Duration: Started end of October to thanksgiving, started up again mid December   Symptoms:  Fever: YES  Chills/Sweats: YES  Headache (location?): YES  Sinus Pressure: YES  Conjunctivitis:  no  Ear Pain: YES: right  Rhinorrhea: no  Congestion: YES  Sore Throat: YES  Cough: YES - yellow mucous  Wheeze: YES  Decreased Appetite: YES  Nausea: YES  Vomiting: YES- mucous  Diarrhea: no  Dysuria/Freq.: no  Dysuria or Hematuria: no  Fatigue/Achiness: YES  Sick/Strep Exposure: yes- influenza A but test came back negative along with Covid.  Therapies tried and outcome: dayquil and nyquil with some relief      Review of Systems   Constitutional, HEENT, cardiovascular, pulmonary, GI, , musculoskeletal, neuro, skin, endocrine and psych systems are negative, except as  otherwise noted.      Objective           Vitals:  No vitals were obtained today due to virtual visit.    Physical Exam   GENERAL: alert, no distress and fatigued  EYES: Eyes grossly normal to inspection.  No discharge or erythema, or obvious scleral/conjunctival abnormalities.  RESP: No audible wheeze, cough, or visible cyanosis.  No visible retractions or increased work of breathing.    SKIN: Visible skin clear. No significant rash, abnormal pigmentation or lesions.  NEURO: Cranial nerves grossly intact.  Mentation and speech appropriate for age.  PSYCH: Mentation appears normal, affect normal/bright, judgement and insight intact, normal speech and appearance well-groomed.            Video-Visit Details    Type of service:  Video Visit    Video End Time:7:42    Originating Location (pt. Location): Home    Distant Location (provider location):  [facility] [place]     Platform used for Video Visit: [employer]

## 2021-12-29 NOTE — PATIENT INSTRUCTIONS
Patient Education     Sinusitis (Antibiotic Treatment)    The sinuses are air-filled spaces within the bones of the face. They connect to the inside of the nose. Sinusitis is an inflammation of the tissue that lines the sinuses. Sinusitis can occur during a cold. It can also happen due to allergies to pollens and other particles in the air. Sinusitis can cause symptoms of sinus congestion and a feeling of fullness. A sinus infection causes fever, headache, and facial pain. There is often green or yellow fluid draining from the nose or into the back of the throat (post-nasal drip). You have been given antibiotics to treat this condition.   Home care    Take the full course of antibiotics as instructed. Don't stop taking them, even when you feel better.    Drink plenty of water, hot tea, and other liquids as directed by the healthcare provider. This may help thin nasal mucus. It also may help your sinuses drain fluids.    Heat may help soothe painful areas of your face. Use a towel soaked in hot water. Or,  the shower and direct the warm spray onto your face. Using a vaporizer along with a menthol rub at night may also help soothe symptoms.     An expectorant with guaifenesin may help thin nasal mucus and help your sinuses drain fluids. Talk with your provider or pharmacists before taking an over-the-counter (OTC) medicine if you have any questions about it or its side effects..    You can use an OTC decongestant, unless a similar medicine was prescribed to you. Nasal sprays work the fastest. Use one that contains phenylephrine or oxymetazoline. First blow your nose gently. Then use the spray. Don't use these medicines more often than directed on the label. If you do, your symptoms may get worse. You may also take pills that contain pseudoephedrine. Don t use products that combine multiple medicines. This is because side effects may be increased. Read labels. You can also ask the pharmacist for help. (People  with high blood pressure should not use decongestants. They can raise blood pressure.) Talk with your provider or pharmacist if you have any questions about the medicine..    OTC antihistamines may help if allergies contributed to your sinusitis. Talk with your provider or pharmacist if you have any questions about the medicine..    Don't use nasal rinses or irrigation during an acute sinus infection, unless your healthcare provider tells you to. Rinsing may spread the infection to other areas in your sinuses.    Use acetaminophen or ibuprofen to control pain, unless another pain medicine was prescribed to you. If you have chronic liver or kidney disease or ever had a stomach ulcer, talk with your healthcare provider before using these medicines. Never give aspirin to anyone under age 18 who is ill with a fever. It may cause severe liver damage.    Don't smoke. This can make symptoms worse.    Follow-up care  Follow up with your healthcare provider, or as advised.   When to seek medical advice  Call your healthcare provider if any of these occur:     Facial pain or headache that gets worse    Stiff neck    Unusual drowsiness or confusion    Swelling of your forehead or eyelids    Symptoms don't go away in 10 days    Vision problems, such as blurred or double vision    Fever of 100.4 F (38 C) or higher, or as directed by your healthcare provider  Call 911  Call 911 if any of these occur:     Seizure    Trouble breathing    Feeling dizzy or faint    Fingernails, skin or lips look blue, purple , or gray  Prevention  Here are steps you can take to help prevent an infection:     Keep good hand washing habits.    Don t have close contact with people who have sore throats, colds, or other upper respiratory infections.    Don t smoke, and stay away from secondhand smoke.    Stay up to date with of your vaccines.  [employer] last reviewed this educational content on 12/1/2019 2000-2021 [employer]. All rights  reserved. This information is not intended as a substitute for professional medical care. Always follow your healthcare professional's instructions.

## 2022-05-21 ENCOUNTER — HEALTH MAINTENANCE LETTER (OUTPATIENT)
Age: 32
End: 2022-05-21

## 2022-09-17 ENCOUNTER — HEALTH MAINTENANCE LETTER (OUTPATIENT)
Age: 32
End: 2022-09-17

## 2023-06-04 ENCOUNTER — HEALTH MAINTENANCE LETTER (OUTPATIENT)
Age: 33
End: 2023-06-04

## 2024-07-13 ENCOUNTER — HEALTH MAINTENANCE LETTER (OUTPATIENT)
Age: 34
End: 2024-07-13

## (undated) DEVICE — SOL WATER IRRIG 1000ML BOTTLE 07139-09